# Patient Record
Sex: MALE | Race: WHITE | Employment: FULL TIME | URBAN - METROPOLITAN AREA
[De-identification: names, ages, dates, MRNs, and addresses within clinical notes are randomized per-mention and may not be internally consistent; named-entity substitution may affect disease eponyms.]

---

## 2017-11-22 ENCOUNTER — OFFICE VISIT (OUTPATIENT)
Dept: FAMILY MEDICINE CLINIC | Facility: CLINIC | Age: 22
End: 2017-11-22

## 2017-11-22 VITALS
OXYGEN SATURATION: 98 % | WEIGHT: 220 LBS | RESPIRATION RATE: 16 BRPM | DIASTOLIC BLOOD PRESSURE: 80 MMHG | HEIGHT: 71 IN | TEMPERATURE: 99 F | HEART RATE: 104 BPM | SYSTOLIC BLOOD PRESSURE: 132 MMHG | BODY MASS INDEX: 30.8 KG/M2

## 2017-11-22 DIAGNOSIS — J02.0 STREP THROAT: Primary | ICD-10-CM

## 2017-11-22 DIAGNOSIS — S57.01XA CRUSHING INJURY OF RIGHT ELBOW, INITIAL ENCOUNTER: ICD-10-CM

## 2017-11-22 PROCEDURE — 87880 STREP A ASSAY W/OPTIC: CPT | Performed by: FAMILY MEDICINE

## 2017-11-22 PROCEDURE — 99203 OFFICE O/P NEW LOW 30 MIN: CPT | Performed by: FAMILY MEDICINE

## 2017-11-22 RX ORDER — DOXYCYCLINE HYCLATE 100 MG/1
CAPSULE ORAL
Qty: 8 CAPSULE | Refills: 0 | Status: SHIPPED | OUTPATIENT
Start: 2017-11-22 | End: 2019-09-02

## 2017-11-22 NOTE — PROGRESS NOTES
Patient presents with:  Nasal Congestion: x 2 days, H/A, dizzy, felt hot        Alexander Meredith is a 20year old male who presents for sore throat. Pain of the throat last  2  days. Not worse or better, pain with swallowing liquids and solids.  Sharp, no radiati without murmur  L elbow: mild pain on deep palpation. ASSESSMENT AND PLAN:   Mendoza Williamson is a 20year old male who presents with acute strep pharyngitis. Increase fluids, Tylenol prn. Strep test:positive.     Signed Prescriptions Disp Refills    Doxycyc

## 2017-11-22 NOTE — PATIENT INSTRUCTIONS
[facility] Alli Silva M.D.     Hand and Upper Extremity Surgery  Physician       77 [facility], [address] [place] [address], [address] [place] [address] 222, [address] [address]     [phone]

## 2019-09-02 ENCOUNTER — APPOINTMENT (OUTPATIENT)
Dept: CT IMAGING | Age: 24
End: 2019-09-02
Attending: EMERGENCY MEDICINE
Payer: COMMERCIAL

## 2019-09-02 ENCOUNTER — HOSPITAL ENCOUNTER (EMERGENCY)
Age: 24
Discharge: HOME | End: 2019-09-02
Attending: EMERGENCY MEDICINE
Payer: COMMERCIAL

## 2019-09-02 ENCOUNTER — OFFICE VISIT (OUTPATIENT)
Dept: FAMILY MEDICINE CLINIC | Facility: CLINIC | Age: 24
End: 2019-09-02
Payer: COMMERCIAL

## 2019-09-02 ENCOUNTER — APPOINTMENT (OUTPATIENT)
Dept: GENERAL RADIOLOGY | Age: 24
End: 2019-09-02
Attending: EMERGENCY MEDICINE
Payer: COMMERCIAL

## 2019-09-02 VITALS
RESPIRATION RATE: 16 BRPM | OXYGEN SATURATION: 98 % | HEIGHT: 72 IN | HEART RATE: 101 BPM | SYSTOLIC BLOOD PRESSURE: 123 MMHG | TEMPERATURE: 99 F | BODY MASS INDEX: 29.39 KG/M2 | DIASTOLIC BLOOD PRESSURE: 72 MMHG | WEIGHT: 217 LBS

## 2019-09-02 VITALS
DIASTOLIC BLOOD PRESSURE: 76 MMHG | HEIGHT: 71 IN | TEMPERATURE: 101 F | HEART RATE: 102 BPM | SYSTOLIC BLOOD PRESSURE: 122 MMHG | WEIGHT: 217 LBS | BODY MASS INDEX: 30.38 KG/M2 | OXYGEN SATURATION: 98 % | RESPIRATION RATE: 24 BRPM

## 2019-09-02 DIAGNOSIS — Z02.9 ADMINISTRATIVE ENCOUNTER: Primary | ICD-10-CM

## 2019-09-02 DIAGNOSIS — R51.9 HEADACHE DISORDER: Primary | ICD-10-CM

## 2019-09-02 DIAGNOSIS — R50.9 FEBRILE ILLNESS, ACUTE: ICD-10-CM

## 2019-09-02 LAB
ALBUMIN SERPL-MCNC: 4.3 G/DL (ref 3.4–5)
ALBUMIN/GLOB SERPL: 1.1 {RATIO} (ref 1–2)
ALP LIVER SERPL-CCNC: 91 U/L (ref 45–117)
ALT SERPL-CCNC: 65 U/L (ref 16–61)
ANION GAP SERPL CALC-SCNC: 7 MMOL/L (ref 0–18)
AST SERPL-CCNC: 37 U/L (ref 15–37)
BASOPHILS # BLD AUTO: 0.04 X10(3) UL (ref 0–0.2)
BASOPHILS NFR BLD AUTO: 0.4 %
BILIRUB SERPL-MCNC: 0.7 MG/DL (ref 0.1–2)
BUN BLD-MCNC: 11 MG/DL (ref 7–18)
BUN/CREAT SERPL: 8.5 (ref 10–20)
CALCIUM BLD-MCNC: 9 MG/DL (ref 8.5–10.1)
CHLORIDE SERPL-SCNC: 105 MMOL/L (ref 98–112)
CLARITY UR REFRACT.AUTO: CLEAR
CO2 SERPL-SCNC: 25 MMOL/L (ref 21–32)
COLOR UR AUTO: YELLOW
CREAT BLD-MCNC: 1.29 MG/DL (ref 0.7–1.3)
DEPRECATED RDW RBC AUTO: 41.3 FL (ref 35.1–46.3)
EOSINOPHIL # BLD AUTO: 0.18 X10(3) UL (ref 0–0.7)
EOSINOPHIL NFR BLD AUTO: 1.9 %
ERYTHROCYTE [DISTWIDTH] IN BLOOD BY AUTOMATED COUNT: 12.4 % (ref 11–15)
GLOBULIN PLAS-MCNC: 3.9 G/DL (ref 2.8–4.4)
GLUCOSE BLD-MCNC: 103 MG/DL (ref 70–99)
GLUCOSE UR STRIP.AUTO-MCNC: NEGATIVE MG/DL
HCT VFR BLD AUTO: 45.5 % (ref 39–53)
HGB BLD-MCNC: 15.6 G/DL (ref 13–17.5)
IMM GRANULOCYTES # BLD AUTO: 0.04 X10(3) UL (ref 0–1)
IMM GRANULOCYTES NFR BLD: 0.4 %
KETONES UR STRIP.AUTO-MCNC: 15 MG/DL
LEUKOCYTE ESTERASE UR QL STRIP.AUTO: NEGATIVE
LYMPHOCYTES # BLD AUTO: 0.98 X10(3) UL (ref 1–4)
LYMPHOCYTES NFR BLD AUTO: 10.6 %
M PROTEIN MFR SERPL ELPH: 8.2 G/DL (ref 6.4–8.2)
MCH RBC QN AUTO: 31.3 PG (ref 26–34)
MCHC RBC AUTO-ENTMCNC: 34.3 G/DL (ref 31–37)
MCV RBC AUTO: 91.2 FL (ref 80–100)
MONOCYTES # BLD AUTO: 0.67 X10(3) UL (ref 0.1–1)
MONOCYTES NFR BLD AUTO: 7.2 %
NEUTROPHILS # BLD AUTO: 7.37 X10 (3) UL (ref 1.5–7.7)
NEUTROPHILS # BLD AUTO: 7.37 X10(3) UL (ref 1.5–7.7)
NEUTROPHILS NFR BLD AUTO: 79.5 %
NITRITE UR QL STRIP.AUTO: NEGATIVE
OSMOLALITY SERPL CALC.SUM OF ELEC: 284 MOSM/KG (ref 275–295)
PH UR STRIP.AUTO: 5.5 [PH] (ref 4.5–8)
PLATELET # BLD AUTO: 291 10(3)UL (ref 150–450)
POTASSIUM SERPL-SCNC: 3.8 MMOL/L (ref 3.5–5.1)
RBC # BLD AUTO: 4.99 X10(6)UL (ref 4.3–5.7)
SODIUM SERPL-SCNC: 137 MMOL/L (ref 136–145)
SP GR UR STRIP.AUTO: 1.02 (ref 1–1.03)
UROBILINOGEN UR STRIP.AUTO-MCNC: 0.2 MG/DL
WBC # BLD AUTO: 9.3 X10(3) UL (ref 4–11)

## 2019-09-02 PROCEDURE — 81003 URINALYSIS AUTO W/O SCOPE: CPT | Performed by: EMERGENCY MEDICINE

## 2019-09-02 PROCEDURE — 85025 COMPLETE CBC W/AUTO DIFF WBC: CPT | Performed by: EMERGENCY MEDICINE

## 2019-09-02 PROCEDURE — 70450 CT HEAD/BRAIN W/O DYE: CPT | Performed by: EMERGENCY MEDICINE

## 2019-09-02 PROCEDURE — 96375 TX/PRO/DX INJ NEW DRUG ADDON: CPT

## 2019-09-02 PROCEDURE — 99284 EMERGENCY DEPT VISIT MOD MDM: CPT

## 2019-09-02 PROCEDURE — 71046 X-RAY EXAM CHEST 2 VIEWS: CPT | Performed by: EMERGENCY MEDICINE

## 2019-09-02 PROCEDURE — 96374 THER/PROPH/DIAG INJ IV PUSH: CPT

## 2019-09-02 PROCEDURE — 80053 COMPREHEN METABOLIC PANEL: CPT | Performed by: EMERGENCY MEDICINE

## 2019-09-02 PROCEDURE — 96361 HYDRATE IV INFUSION ADD-ON: CPT

## 2019-09-02 RX ORDER — KETOROLAC TROMETHAMINE 30 MG/ML
30 INJECTION, SOLUTION INTRAMUSCULAR; INTRAVENOUS ONCE
Status: COMPLETED | OUTPATIENT
Start: 2019-09-02 | End: 2019-09-02

## 2019-09-02 RX ORDER — ACETAMINOPHEN 500 MG
1000 TABLET ORAL ONCE
Status: COMPLETED | OUTPATIENT
Start: 2019-09-02 | End: 2019-09-02

## 2019-09-02 RX ORDER — METOCLOPRAMIDE 10 MG/1
10 TABLET ORAL 3 TIMES DAILY PRN
Qty: 20 TABLET | Refills: 0 | Status: SHIPPED | OUTPATIENT
Start: 2019-09-02 | End: 2019-10-02

## 2019-09-02 RX ORDER — DIPHENHYDRAMINE HYDROCHLORIDE 50 MG/ML
25 INJECTION INTRAMUSCULAR; INTRAVENOUS ONCE
Status: COMPLETED | OUTPATIENT
Start: 2019-09-02 | End: 2019-09-02

## 2019-09-02 RX ORDER — METOCLOPRAMIDE HYDROCHLORIDE 5 MG/ML
10 INJECTION INTRAMUSCULAR; INTRAVENOUS ONCE
Status: COMPLETED | OUTPATIENT
Start: 2019-09-02 | End: 2019-09-02

## 2019-09-02 NOTE — ED PROVIDER NOTES
Patient Seen in: THE [facility] Emergency Department In [place]    History   Patient presents with:  Headache (neurologic)  Fever (infectious)    Stated Complaint: Headache x 5 days, fever 102.  today    HPI    40-year-old white male who presents the emerge fr low-grade temperature and is tachycardic. Head is normocephalic atraumatic conjunctiva is clear. Pupils are equal round reactive to light extraocular motions are intact. Patient has normal facial symmetry. Tongue is midline.   Neck is supple without men individual orders.    RAINBOW DRAW BLUE   RAINBOW DRAW LAVENDER   RAINBOW DRAW LIGHT GREEN   RAINBOW DRAW GOLD          CT scan of the head reveals:    FINDINGS:    Ventricles and sulci are within normal limits.  There is no focal parenchymal attenuation ab symptoms develop.               Disposition and Plan     Clinical Impression:  Headache disorder  (primary encounter diagnosis)  Febrile illness, acute    Disposition:  Discharge  9/2/2019  6:59 pm    Follow-up:  Kim Bearden MD  [address] [place] [address]

## 2019-09-02 NOTE — PROGRESS NOTES
Pt presented with fever, headache, nauseous, and dizziness X 5 days. Reports hx of migraines as a child after playing sports, but not in years. He did play softball 5 nights ago.   Headache is all over, relieved with Excedrin but comes back worse after me

## 2019-09-02 NOTE — ED INITIAL ASSESSMENT (HPI)
Pt c/o headache for 5 days with a fever that began today; pt has been taking Tylenol, Motrin and Excedrin without relief;

## 2019-09-03 ENCOUNTER — TELEPHONE (OUTPATIENT)
Dept: FAMILY MEDICINE CLINIC | Facility: CLINIC | Age: 24
End: 2019-09-03

## 2019-09-04 ENCOUNTER — OFFICE VISIT (OUTPATIENT)
Dept: FAMILY MEDICINE CLINIC | Facility: CLINIC | Age: 24
End: 2019-09-04
Payer: COMMERCIAL

## 2019-09-04 ENCOUNTER — LAB ENCOUNTER (OUTPATIENT)
Dept: LAB | Age: 24
End: 2019-09-04
Attending: NURSE PRACTITIONER
Payer: COMMERCIAL

## 2019-09-04 VITALS
HEART RATE: 84 BPM | SYSTOLIC BLOOD PRESSURE: 120 MMHG | DIASTOLIC BLOOD PRESSURE: 70 MMHG | HEIGHT: 71 IN | TEMPERATURE: 98 F | OXYGEN SATURATION: 99 % | BODY MASS INDEX: 29.96 KG/M2 | WEIGHT: 214 LBS | RESPIRATION RATE: 16 BRPM

## 2019-09-04 DIAGNOSIS — R50.9 FEVER, UNSPECIFIED FEVER CAUSE: ICD-10-CM

## 2019-09-04 DIAGNOSIS — G43.909 MIGRAINE WITHOUT STATUS MIGRAINOSUS, NOT INTRACTABLE, UNSPECIFIED MIGRAINE TYPE: Primary | ICD-10-CM

## 2019-09-04 DIAGNOSIS — R11.2 NAUSEA AND VOMITING, INTRACTABILITY OF VOMITING NOT SPECIFIED, UNSPECIFIED VOMITING TYPE: ICD-10-CM

## 2019-09-04 LAB
ALBUMIN SERPL-MCNC: 3.9 G/DL (ref 3.4–5)
ALBUMIN/GLOB SERPL: 0.9 {RATIO} (ref 1–2)
ALP LIVER SERPL-CCNC: 80 U/L (ref 45–117)
ALT SERPL-CCNC: 55 U/L (ref 16–61)
ANION GAP SERPL CALC-SCNC: 9 MMOL/L (ref 0–18)
APPEARANCE: CLEAR
AST SERPL-CCNC: 24 U/L (ref 15–37)
BASOPHILS # BLD AUTO: 0.04 X10(3) UL (ref 0–0.2)
BASOPHILS NFR BLD AUTO: 0.4 %
BILIRUB SERPL-MCNC: 0.9 MG/DL (ref 0.1–2)
BILIRUBIN: NEGATIVE
BUN BLD-MCNC: 13 MG/DL (ref 7–18)
BUN/CREAT SERPL: 10.4 (ref 10–20)
CALCIUM BLD-MCNC: 9.3 MG/DL (ref 8.5–10.1)
CHLORIDE SERPL-SCNC: 102 MMOL/L (ref 98–112)
CO2 SERPL-SCNC: 28 MMOL/L (ref 21–32)
CONTROL LINE PRESENT WITH A CLEAR BACKGROUND (YES/NO): YES YES/NO
CREAT BLD-MCNC: 1.25 MG/DL (ref 0.7–1.3)
DEPRECATED RDW RBC AUTO: 42.7 FL (ref 35.1–46.3)
EOSINOPHIL # BLD AUTO: 0.19 X10(3) UL (ref 0–0.7)
EOSINOPHIL NFR BLD AUTO: 1.7 %
ERYTHROCYTE [DISTWIDTH] IN BLOOD BY AUTOMATED COUNT: 12.4 % (ref 11–15)
FLUAV + FLUBV RNA SPEC NAA+PROBE: NEGATIVE
GLOBULIN PLAS-MCNC: 4.3 G/DL (ref 2.8–4.4)
GLUCOSE (URINE DIPSTICK): NEGATIVE MG/DL
GLUCOSE BLD-MCNC: 93 MG/DL (ref 70–99)
HAV IGM SER QL: NONREACTIVE
HBV CORE IGM SER QL: NONREACTIVE
HBV SURFACE AG SERPL QL IA: NONREACTIVE
HCT VFR BLD AUTO: 47.6 % (ref 39–53)
HCV AB SERPL QL IA: NONREACTIVE
HGB BLD-MCNC: 15.6 G/DL (ref 13–17.5)
IMM GRANULOCYTES # BLD AUTO: 0.08 X10(3) UL (ref 0–1)
IMM GRANULOCYTES NFR BLD: 0.7 %
KETONES (URINE DIPSTICK): NEGATIVE MG/DL
LEUKOCYTES: NEGATIVE
LYMPHOCYTES # BLD AUTO: 1.31 X10(3) UL (ref 1–4)
LYMPHOCYTES NFR BLD AUTO: 11.5 %
M PROTEIN MFR SERPL ELPH: 8.2 G/DL (ref 6.4–8.2)
MCH RBC QN AUTO: 30.4 PG (ref 26–34)
MCHC RBC AUTO-ENTMCNC: 32.8 G/DL (ref 31–37)
MCV RBC AUTO: 92.8 FL (ref 80–100)
MONOCYTES # BLD AUTO: 0.5 X10(3) UL (ref 0.1–1)
MONOCYTES NFR BLD AUTO: 4.4 %
MONOSCREEN: NEGATIVE
MULTISTIX LOT#: NORMAL NUMERIC
NEUTROPHILS # BLD AUTO: 9.24 X10 (3) UL (ref 1.5–7.7)
NEUTROPHILS # BLD AUTO: 9.24 X10(3) UL (ref 1.5–7.7)
NEUTROPHILS NFR BLD AUTO: 81.3 %
NITRITE, URINE: NEGATIVE
OSMOLALITY SERPL CALC.SUM OF ELEC: 288 MOSM/KG (ref 275–295)
PH, URINE: 6 (ref 4.5–8)
PLATELET # BLD AUTO: 350 10(3)UL (ref 150–450)
POTASSIUM SERPL-SCNC: 4.5 MMOL/L (ref 3.5–5.1)
RBC # BLD AUTO: 5.13 X10(6)UL (ref 4.3–5.7)
SED RATE-ML: 19 MM/HR (ref 0–12)
SODIUM SERPL-SCNC: 139 MMOL/L (ref 136–145)
SPECIFIC GRAVITY: 1.01 (ref 1–1.03)
STREP GRP A CUL-SCR: NEGATIVE
TSI SER-ACNC: 3.31 MIU/ML (ref 0.36–3.74)
URINE-COLOR: YELLOW
UROBILINOGEN,SEMI-QN: 1 MG/DL (ref 0–1.9)
VIT D+METAB SERPL-MCNC: 16.9 NG/ML (ref 30–100)
WBC # BLD AUTO: 11.4 X10(3) UL (ref 4–11)

## 2019-09-04 PROCEDURE — 86403 PARTICLE AGGLUT ANTBDY SCRN: CPT | Performed by: NURSE PRACTITIONER

## 2019-09-04 PROCEDURE — 36415 COLL VENOUS BLD VENIPUNCTURE: CPT | Performed by: NURSE PRACTITIONER

## 2019-09-04 PROCEDURE — 87880 STREP A ASSAY W/OPTIC: CPT | Performed by: NURSE PRACTITIONER

## 2019-09-04 PROCEDURE — 87798 DETECT AGENT NOS DNA AMP: CPT | Performed by: NURSE PRACTITIONER

## 2019-09-04 PROCEDURE — 80074 ACUTE HEPATITIS PANEL: CPT | Performed by: NURSE PRACTITIONER

## 2019-09-04 PROCEDURE — 80050 GENERAL HEALTH PANEL: CPT | Performed by: NURSE PRACTITIONER

## 2019-09-04 PROCEDURE — 82306 VITAMIN D 25 HYDROXY: CPT | Performed by: NURSE PRACTITIONER

## 2019-09-04 PROCEDURE — 86788 WEST NILE VIRUS AB IGM: CPT | Performed by: NURSE PRACTITIONER

## 2019-09-04 PROCEDURE — 81003 URINALYSIS AUTO W/O SCOPE: CPT | Performed by: NURSE PRACTITIONER

## 2019-09-04 PROCEDURE — 86789 WEST NILE VIRUS ANTIBODY: CPT | Performed by: NURSE PRACTITIONER

## 2019-09-04 PROCEDURE — 99214 OFFICE O/P EST MOD 30 MIN: CPT | Performed by: NURSE PRACTITIONER

## 2019-09-04 PROCEDURE — 87502 INFLUENZA DNA AMP PROBE: CPT | Performed by: NURSE PRACTITIONER

## 2019-09-04 PROCEDURE — 85652 RBC SED RATE AUTOMATED: CPT | Performed by: NURSE PRACTITIONER

## 2019-09-04 PROCEDURE — 87081 CULTURE SCREEN ONLY: CPT | Performed by: NURSE PRACTITIONER

## 2019-09-04 RX ORDER — ONDANSETRON 4 MG/1
4 TABLET, FILM COATED ORAL EVERY 8 HOURS PRN
Qty: 15 TABLET | Refills: 1 | Status: SHIPPED | OUTPATIENT
Start: 2019-09-04 | End: 2021-02-12 | Stop reason: ALTCHOICE

## 2019-09-04 RX ORDER — BUTALBITAL AND ACETAMINOPHEN 50; 325 MG/1; MG/1
1 TABLET ORAL 4 TIMES DAILY PRN
Qty: 60 TABLET | Refills: 0 | Status: SHIPPED | OUTPATIENT
Start: 2019-09-04 | End: 2021-02-12 | Stop reason: ALTCHOICE

## 2019-09-04 RX ORDER — ACETAMINOPHEN 500 MG
500 TABLET ORAL EVERY 6 HOURS PRN
COMMUNITY
End: 2021-02-12 | Stop reason: ALTCHOICE

## 2019-09-04 RX ORDER — IBUPROFEN 200 MG
200 TABLET ORAL EVERY 6 HOURS PRN
COMMUNITY
End: 2021-02-12 | Stop reason: ALTCHOICE

## 2019-09-04 NOTE — PROGRESS NOTES
CHIEF COMPLAINT:     Patient presents with:  ER F/U: NP ER follow up, c/o headache, fever and rash        HPI:     Michelle Doll is a 21year old male presents with complaints of headaches. Has had this series of Headaches since  Wednesday 8/28/19.  Reports pl Headaches as described above. Denies lightheadedness. No seizures, no confusion, no weakness, no abnormal sensation, no vertigo.     EXAM:   /70   Pulse 84   Temp 98 °F (36.7 °C) (Oral)   Resp 16   Ht 71\"   Wt 214 lb   SpO2 99%   BMI 29.85 kg/m² tablet; Take 1 tablet (4 mg total) by mouth every 8 (eight) hours as needed for Nausea.     F/u in 2 days if not getting better or getting worse   Increase fluids , Tylenol for fever

## 2019-09-04 NOTE — PATIENT INSTRUCTIONS
Migraine Headache  This often severe type of headache is different from other types of headaches in that symptoms other than pain occur with the headache.  Nausea and vomiting, lightheadedness, sensitivity to light (photophobia), and other visual disturba If stress seems to be a trigger for your headaches, figure out what is causing stress in your life. Learn new ways to handle your stress. Ideas include regular exercise, biofeedback, self-hypnosis, yoga, and meditation.  Talk with your healthcare provider t Call your healthcare provider right away if any of these occur:  · Your head pain gets worse, or doesn’t get better within 24 hours  · You can’t keep liquids down (repeated vomiting)  · Pain in your sinuses, ears, or throat  · Fever of 100.4º F (38º C) or · Your appetite may be poor, so a light diet is fine. Avoid dehydration by drinking 6 to 8 glasses of fluids per day (such as water, soft drinks, sports drinks, juices, tea, or soup).  If you have congestion, extra fluids will help loosen secretions in the · Confusion  · Extreme drowsiness or trouble awakening  · Fainting or loss of consciousness  · Rapid heart rate  · Low blood pressure  · Vomiting blood, or large amounts of blood in stool  · Seizure  When to seek medical advice  Call your healthcare provid

## 2019-09-06 ENCOUNTER — TELEPHONE (OUTPATIENT)
Dept: FAMILY MEDICINE CLINIC | Facility: CLINIC | Age: 24
End: 2019-09-06

## 2019-09-06 LAB
WEST NILE VIRUS AB, IGG, SER: 0.52 IV
WEST NILE VIRUS AB, IGM, SER: 0.05 IV

## 2019-09-06 NOTE — TELEPHONE ENCOUNTER
----- Message from PARVEEN Awan-CHRISTOPHER sent at 9/5/2019  7:30 AM CDT -----  Vitamin D is very low -sent new prescription for Vitamin D take it once a week , white blood cells slightly elevated sent prescription for antibiotics daily x 7 days      levoflox

## 2019-09-06 NOTE — TELEPHONE ENCOUNTER
Left detailed VM for pt regarding results and instructions listed below. Pt instructed to call back with any further questions/concerns. [employer] msg sent.

## 2019-09-09 ENCOUNTER — TELEPHONE (OUTPATIENT)
Dept: FAMILY MEDICINE CLINIC | Facility: CLINIC | Age: 24
End: 2019-09-09

## 2020-08-12 ENCOUNTER — TELEPHONE (OUTPATIENT)
Dept: FAMILY MEDICINE CLINIC | Facility: CLINIC | Age: 25
End: 2020-08-12

## 2020-08-12 NOTE — TELEPHONE ENCOUNTER
Patient called, he had a positive Covid test on July 31 at [address], he needs to go back to work and they are requiring a negative Covid test. He would like to know if Dr. Priscilla Franco will order a test or where he can get one done.     Please

## 2020-08-12 NOTE — TELEPHONE ENCOUNTER
Detailed VM left for patient advising that THE [facility] is not retesting after a positive result-not CDC guidelines. I referred him to [facility] testing or [facility] for a list of testing locations. Encouraged call back with any questions.

## 2021-02-01 ENCOUNTER — PATIENT MESSAGE (OUTPATIENT)
Dept: FAMILY MEDICINE CLINIC | Facility: CLINIC | Age: 26
End: 2021-02-01

## 2021-02-02 NOTE — TELEPHONE ENCOUNTER
From: Joe Bourgeois  To: ANA LAURA Velazquez  Sent: 2/1/2021 8:33 PM CST  Subject: Other    Hello,    Do I need a doctors order to go in for blood work? I haven't had it done recently and I'd like to do that as well as finding out my blood type.      Carlos

## 2021-02-11 PROBLEM — S54.10XD: Status: ACTIVE | Noted: 2018-08-03

## 2021-02-12 ENCOUNTER — OFFICE VISIT (OUTPATIENT)
Dept: FAMILY MEDICINE CLINIC | Facility: CLINIC | Age: 26
End: 2021-02-12
Payer: COMMERCIAL

## 2021-02-12 VITALS
SYSTOLIC BLOOD PRESSURE: 136 MMHG | DIASTOLIC BLOOD PRESSURE: 88 MMHG | HEIGHT: 71 IN | BODY MASS INDEX: 33.04 KG/M2 | HEART RATE: 103 BPM | RESPIRATION RATE: 17 BRPM | WEIGHT: 236 LBS | TEMPERATURE: 98 F | OXYGEN SATURATION: 97 %

## 2021-02-12 DIAGNOSIS — R21 PENILE RASH: ICD-10-CM

## 2021-02-12 DIAGNOSIS — E66.9 OBESITY (BMI 30-39.9): ICD-10-CM

## 2021-02-12 DIAGNOSIS — Z00.00 ENCOUNTER FOR ANNUAL PHYSICAL EXAM: Primary | ICD-10-CM

## 2021-02-12 DIAGNOSIS — Z00.00 LABORATORY EXAMINATION ORDERED AS PART OF A ROUTINE GENERAL MEDICAL EXAMINATION: ICD-10-CM

## 2021-02-12 DIAGNOSIS — E55.9 VITAMIN D DEFICIENCY: ICD-10-CM

## 2021-02-12 PROCEDURE — 3008F BODY MASS INDEX DOCD: CPT | Performed by: EMERGENCY MEDICINE

## 2021-02-12 PROCEDURE — 3079F DIAST BP 80-89 MM HG: CPT | Performed by: EMERGENCY MEDICINE

## 2021-02-12 PROCEDURE — 3075F SYST BP GE 130 - 139MM HG: CPT | Performed by: EMERGENCY MEDICINE

## 2021-02-12 PROCEDURE — 99395 PREV VISIT EST AGE 18-39: CPT | Performed by: EMERGENCY MEDICINE

## 2021-02-12 NOTE — PATIENT INSTRUCTIONS
Thank you for choosing [facility]  To Do:  FOR JADON Milner [address]        1. Follow up yearly or as needed  2. Have blood tests done after fasting  3. Need to lose weight  4. Overall decreased caloric intake in order to promote weight loss.   Eat 1500 pressure is less than 120/80 mm Hg; yearly if your systolic blood pressure is 120 to 139 mm Hg, or your diastolic blood pressure reading is 80 to 89 mm Hg   Depression All men in this age group At routine exams   Diabetes mellitus, type 2 Adults who have n first dose   Influenza (flu) All men in this age group Once a year   Measles, mumps, rubella (MMR) All men in this age group who have no record of these infections or vaccines 1 or 2 doses through age 54   Meningococcal Men at increased risk for infection

## 2021-02-12 NOTE — PROGRESS NOTES
Chief Complaint:   Patient presents with:  Physical: Annual physical     HPI:   This is a 31year old male who present for a yearly annual exam    WELL-MALE EXAM     1. Age:   31   2. Have you had any of the following problems:         a.  High blood pr Alcohol use: Yes    Drug use: No    Family History:  Family History   Problem Relation Age of Onset   • No Known Problems Father    • No Known Problems Mother        Allergies     Ceclor [Cefaclor]       RASH    No current outpatient medications on file pr VITAMIN D, 25-HYDROXY; Future    4. Penile rash  Hemangioma?  - DERM - INTERNAL    5. Obesity (BMI 30-39. 9)  Counseled on weight loss          PATIENT INSTRUCTIONS:     1. Follow up yearly or as needed  2. Have blood tests done after fasting  3.  Need to lo

## 2021-02-20 ENCOUNTER — LAB ENCOUNTER (OUTPATIENT)
Dept: LAB | Age: 26
End: 2021-02-20
Attending: EMERGENCY MEDICINE
Payer: COMMERCIAL

## 2021-02-20 ENCOUNTER — PATIENT MESSAGE (OUTPATIENT)
Dept: FAMILY MEDICINE CLINIC | Facility: CLINIC | Age: 26
End: 2021-02-20

## 2021-02-20 DIAGNOSIS — Z00.00 LABORATORY EXAMINATION ORDERED AS PART OF A ROUTINE GENERAL MEDICAL EXAMINATION: ICD-10-CM

## 2021-02-20 DIAGNOSIS — E55.9 VITAMIN D DEFICIENCY: ICD-10-CM

## 2021-02-20 LAB
ALBUMIN SERPL-MCNC: 4.4 G/DL (ref 3.4–5)
ALBUMIN/GLOB SERPL: 1.2 {RATIO} (ref 1–2)
ALP LIVER SERPL-CCNC: 55 U/L
ALT SERPL-CCNC: 37 U/L
ANION GAP SERPL CALC-SCNC: 4 MMOL/L (ref 0–18)
AST SERPL-CCNC: 22 U/L (ref 15–37)
BASOPHILS # BLD AUTO: 0.05 X10(3) UL (ref 0–0.2)
BASOPHILS NFR BLD AUTO: 0.8 %
BILIRUB SERPL-MCNC: 1 MG/DL (ref 0.1–2)
BUN BLD-MCNC: 13 MG/DL (ref 7–18)
BUN/CREAT SERPL: 12.4 (ref 10–20)
CALCIUM BLD-MCNC: 9.4 MG/DL (ref 8.5–10.1)
CHLORIDE SERPL-SCNC: 107 MMOL/L (ref 98–112)
CHOLEST SMN-MCNC: 153 MG/DL (ref ?–200)
CO2 SERPL-SCNC: 30 MMOL/L (ref 21–32)
CREAT BLD-MCNC: 1.05 MG/DL
DEPRECATED RDW RBC AUTO: 42.6 FL (ref 35.1–46.3)
EOSINOPHIL # BLD AUTO: 0.15 X10(3) UL (ref 0–0.7)
EOSINOPHIL NFR BLD AUTO: 2.3 %
ERYTHROCYTE [DISTWIDTH] IN BLOOD BY AUTOMATED COUNT: 12.4 % (ref 11–15)
GLOBULIN PLAS-MCNC: 3.6 G/DL (ref 2.8–4.4)
GLUCOSE BLD-MCNC: 98 MG/DL (ref 70–99)
HCT VFR BLD AUTO: 46.9 %
HDLC SERPL-MCNC: 55 MG/DL (ref 40–59)
HGB BLD-MCNC: 15.3 G/DL
IMM GRANULOCYTES # BLD AUTO: 0.02 X10(3) UL (ref 0–1)
IMM GRANULOCYTES NFR BLD: 0.3 %
LDLC SERPL CALC-MCNC: 82 MG/DL (ref ?–100)
LYMPHOCYTES # BLD AUTO: 2 X10(3) UL (ref 1–4)
LYMPHOCYTES NFR BLD AUTO: 30.3 %
M PROTEIN MFR SERPL ELPH: 8 G/DL (ref 6.4–8.2)
MCH RBC QN AUTO: 30.8 PG (ref 26–34)
MCHC RBC AUTO-ENTMCNC: 32.6 G/DL (ref 31–37)
MCV RBC AUTO: 94.4 FL
MONOCYTES # BLD AUTO: 0.63 X10(3) UL (ref 0.1–1)
MONOCYTES NFR BLD AUTO: 9.6 %
NEUTROPHILS # BLD AUTO: 3.74 X10 (3) UL (ref 1.5–7.7)
NEUTROPHILS # BLD AUTO: 3.74 X10(3) UL (ref 1.5–7.7)
NEUTROPHILS NFR BLD AUTO: 56.7 %
NONHDLC SERPL-MCNC: 98 MG/DL (ref ?–130)
OSMOLALITY SERPL CALC.SUM OF ELEC: 292 MOSM/KG (ref 275–295)
PATIENT FASTING Y/N/NP: YES
PATIENT FASTING Y/N/NP: YES
PLATELET # BLD AUTO: 326 10(3)UL (ref 150–450)
POTASSIUM SERPL-SCNC: 4.5 MMOL/L (ref 3.5–5.1)
RBC # BLD AUTO: 4.97 X10(6)UL
SODIUM SERPL-SCNC: 141 MMOL/L (ref 136–145)
TRIGL SERPL-MCNC: 80 MG/DL (ref 30–149)
TSI SER-ACNC: 1.59 MIU/ML (ref 0.36–3.74)
VIT D+METAB SERPL-MCNC: 28.1 NG/ML (ref 30–100)
VLDLC SERPL CALC-MCNC: 16 MG/DL (ref 0–30)
WBC # BLD AUTO: 6.6 X10(3) UL (ref 4–11)

## 2021-02-20 PROCEDURE — 80050 GENERAL HEALTH PANEL: CPT | Performed by: EMERGENCY MEDICINE

## 2021-02-20 PROCEDURE — 36415 COLL VENOUS BLD VENIPUNCTURE: CPT | Performed by: EMERGENCY MEDICINE

## 2021-02-20 PROCEDURE — 82306 VITAMIN D 25 HYDROXY: CPT | Performed by: EMERGENCY MEDICINE

## 2021-02-20 PROCEDURE — 80061 LIPID PANEL: CPT | Performed by: EMERGENCY MEDICINE

## 2021-02-22 ENCOUNTER — PATIENT MESSAGE (OUTPATIENT)
Dept: FAMILY MEDICINE CLINIC | Facility: CLINIC | Age: 26
End: 2021-02-22

## 2021-02-22 DIAGNOSIS — E55.9 VITAMIN D DEFICIENCY: Primary | ICD-10-CM

## 2021-02-22 NOTE — TELEPHONE ENCOUNTER
From: Izabel Candelario  To: Izzy Gonzalez MD  Sent: 2/22/2021 10:11 AM CST  Subject: Test Results Question    Hi Sugar Toribio     I will start taking vitamin d. Glad to see levels are good.  I had requested to also figure out my blood type did that test not g

## 2021-02-22 NOTE — TELEPHONE ENCOUNTER
From: Luba Draper  To: Jaime Cao MD  Sent: 2/20/2021 4:08 PM CST  Subject: Test Results Question    How do I find out my blood type?

## 2022-06-22 ENCOUNTER — HOSPITAL ENCOUNTER (OUTPATIENT)
Age: 27
Discharge: HOME | End: 2022-06-22
Payer: COMMERCIAL

## 2022-06-22 ENCOUNTER — APPOINTMENT (OUTPATIENT)
Dept: GENERAL RADIOLOGY | Age: 27
End: 2022-06-22
Attending: PHYSICIAN ASSISTANT
Payer: COMMERCIAL

## 2022-06-22 VITALS
HEART RATE: 98 BPM | OXYGEN SATURATION: 97 % | BODY MASS INDEX: 29.8 KG/M2 | WEIGHT: 220 LBS | HEIGHT: 72 IN | RESPIRATION RATE: 17 BRPM | TEMPERATURE: 99 F | SYSTOLIC BLOOD PRESSURE: 141 MMHG | DIASTOLIC BLOOD PRESSURE: 82 MMHG

## 2022-06-22 DIAGNOSIS — M70.21 OLECRANON BURSITIS OF RIGHT ELBOW: Primary | ICD-10-CM

## 2022-06-22 DIAGNOSIS — L08.9 SKIN INFECTION: ICD-10-CM

## 2022-06-22 PROCEDURE — 96372 THER/PROPH/DIAG INJ SC/IM: CPT

## 2022-06-22 PROCEDURE — 99214 OFFICE O/P EST MOD 30 MIN: CPT

## 2022-06-22 PROCEDURE — 73080 X-RAY EXAM OF ELBOW: CPT | Performed by: PHYSICIAN ASSISTANT

## 2022-06-22 RX ORDER — KETOROLAC TROMETHAMINE 30 MG/ML
30 INJECTION, SOLUTION INTRAMUSCULAR; INTRAVENOUS ONCE
Status: COMPLETED | OUTPATIENT
Start: 2022-06-22 | End: 2022-06-22

## 2022-06-22 RX ORDER — CLINDAMYCIN HYDROCHLORIDE 300 MG/1
300 CAPSULE ORAL 3 TIMES DAILY
Qty: 30 CAPSULE | Refills: 0 | Status: SHIPPED | OUTPATIENT
Start: 2022-06-22 | End: 2022-07-02

## 2024-01-20 ENCOUNTER — PATIENT MESSAGE (OUTPATIENT)
Dept: FAMILY MEDICINE CLINIC | Facility: CLINIC | Age: 29
End: 2024-01-20

## 2024-01-22 NOTE — TELEPHONE ENCOUNTER
From: Dinh Cade  To: Pat Main  Sent: 1/20/2024 5:07 PM CST  Subject: labs    Hello, I’d like to do whatever labs are included in a routine checkup as I haven’t been in for 3 years. I wasn’t sure if I can walk in without an order for labs to request it all or if you had to order them.    Thanks   Dinh Cade

## 2024-03-18 ENCOUNTER — OFFICE VISIT (OUTPATIENT)
Dept: FAMILY MEDICINE CLINIC | Facility: CLINIC | Age: 29
End: 2024-03-18
Payer: COMMERCIAL

## 2024-03-18 VITALS
HEIGHT: 72 IN | OXYGEN SATURATION: 97 % | WEIGHT: 221 LBS | BODY MASS INDEX: 29.93 KG/M2 | HEART RATE: 102 BPM | RESPIRATION RATE: 18 BRPM | SYSTOLIC BLOOD PRESSURE: 134 MMHG | DIASTOLIC BLOOD PRESSURE: 80 MMHG | TEMPERATURE: 98 F

## 2024-03-18 DIAGNOSIS — J02.9 SORE THROAT: ICD-10-CM

## 2024-03-18 DIAGNOSIS — J02.9 ACUTE VIRAL PHARYNGITIS: Primary | ICD-10-CM

## 2024-03-18 LAB
CONTROL LINE PRESENT WITH A CLEAR BACKGROUND (YES/NO): YES YES/NO
KIT LOT #: NORMAL NUMERIC
STREP GRP A CUL-SCR: NEGATIVE

## 2024-03-18 NOTE — PROGRESS NOTES
CHIEF COMPLAINT:     Chief Complaint   Patient presents with    Sore Throat     Would like to get tested for strep - Entered by patient  S/s for 1 week and cough for 1-1.5 month         HPI:   Dinh Cade is a 28 year old male presents to clinic with complaint of sore throat. Patient has had 5 days.  (+) odynophagia without dysphagia, HA in AMs only.    OTC lozenges with transient relief.  Afebrile, no chills/sweats, no SOb/BARRIENTOS, no cough, no n/v/d, no rash.   Daughter with influenza B x 2 weeks ago, pt ill with similar symptoms at same time.     No current outpatient medications on file.      No past medical history on file.   Social History:  Social History     Socioeconomic History    Marital status: Single   Tobacco Use    Smoking status: Never    Smokeless tobacco: Never   Vaping Use    Vaping Use: Never used   Substance and Sexual Activity    Alcohol use: Yes    Drug use: No        REVIEW OF SYSTEMS:   GENERAL HEALTH: normal appetite  SKIN: Per HPI  HEENT: denies ear pain, See HPI  RESPIRATORY: denies shortness of breath or wheezing  CARDIOVASCULAR: denies chest pain or palpitations   GI: denies vomiting or diarrhea  NEURO: denies dizziness or lightheadedness    EXAM:   /80   Pulse 102   Temp 97.9 °F (36.6 °C) (Oral)   Resp 18   Ht 6' (1.829 m)   Wt 221 lb (100.2 kg)   SpO2 97%   BMI 29.97 kg/m²   GENERAL: well developed, well nourished,in no apparent distress  SKIN: no rashes,no suspicious lesions  HEAD: atraumatic, normocephalic  EYES: conjunctiva clear, EOM intact  EARS: TM's clear, non-injected, no bulging, retraction, or fluid bilaterally  NOSE: nostrils patent, clear nasal discharge, nasal mucosa boggy  THROAT: oral mucosa pink, moist. Posterior pharynx (+) erythematous and injected. without exudates. Tonsils 1+/4.  Breath non malodorous   NECK: supple, trachea midline, no stridor.   LUNGS: clear to auscultation bilaterally, no wheezes or rhonchi. Breathing is non labored.  CARDIO: RRR  without murmur  EXTREMITIES: no cyanosis, clubbing or edema  LYMPH: shotty anterior cervical. no submandibular lymphadenopathy.  No posterior cervical or occipital lymphadenopathy.    Recent Results (from the past 24 hour(s))   Rapid Strep    Collection Time: 03/18/24  4:51 PM   Result Value Ref Range    Strep Grp A Screen      Control Line Present with a clear background (yes/no) Yes Yes/No    Kit Lot # [id number] Numeric    Kit Expiration Date 5/21/2025 Date         ASSESSMENT AND PLAN:   Assessment:   Encounter Diagnoses   Name Primary?    Sore throat     Acute viral pharyngitis Yes       Plan:   Discussed that due to symptoms and negative rapid strep this is most likely viral and does not require antibiotics.  Pt decline cx.  Expected course/duration reviewed.   Discussed viral panel screening--covid-19/influenza A (pt suspects he already had influenza B given dtr dx and pt was ill with similar symptoms at same time).   Pt declines further screening.  Supportive care reviewed, discussed magic mouthwash--declined by pt.      Comfort measures explained and discussed as listed in Patient Instructions    Follow up with PCP in 3-5 days if not improving, condition worsens, or fever greater than or equal to 100.4 persists for 72 hours.    The patient/parent indicates understanding of these issues and agrees to the plan.    Karen Sinclair PA-C

## 2024-03-23 ENCOUNTER — OFFICE VISIT (OUTPATIENT)
Dept: FAMILY MEDICINE CLINIC | Facility: CLINIC | Age: 29
End: 2024-03-23
Payer: COMMERCIAL

## 2024-03-23 VITALS
BODY MASS INDEX: 30.48 KG/M2 | WEIGHT: 225 LBS | OXYGEN SATURATION: 99 % | SYSTOLIC BLOOD PRESSURE: 125 MMHG | RESPIRATION RATE: 16 BRPM | HEIGHT: 72 IN | DIASTOLIC BLOOD PRESSURE: 83 MMHG | TEMPERATURE: 97 F | HEART RATE: 79 BPM

## 2024-03-23 DIAGNOSIS — J02.9 SORE THROAT: Primary | ICD-10-CM

## 2024-03-23 PROCEDURE — 87081 CULTURE SCREEN ONLY: CPT | Performed by: NURSE PRACTITIONER

## 2024-03-23 NOTE — PROGRESS NOTES
CHIEF COMPLAINT:     Chief Complaint   Patient presents with    Sore Throat     Sore throat is worsening from last visit on 3/18/24        HPI:   Dinh Cade is a 28 year old male presents to clinic with symptoms of sore throat. Patient has had for 10 days. Symptoms have been worsening since onset.  Patient reports following associated symptoms: minimal congestion. Slight swollen glands.  No fevers, chills, aches. No abd pain.  Denies hx of GERD or Mono in past that he is aware of.  Has remote history of strep. No one is sick at home right now.  Treating symptoms with: ibuprofen.  Reports sore throat is worse in the morning and gets slightly better during the day.  Ibuprofen does help symptoms, but returns after wears off.  Eating, drinking and sleeping ok.     No current outpatient medications on file.      No past medical history on file.   Social History:  Social History     Socioeconomic History    Marital status: Single   Tobacco Use    Smoking status: Never    Smokeless tobacco: Never   Vaping Use    Vaping Use: Never used   Substance and Sexual Activity    Alcohol use: Yes    Drug use: No        REVIEW OF SYSTEMS:   GENERAL HEALTH: feels well otherwise  SKIN: denies any unusual skin lesions or rashes  HEENT: denies ear pain, See HPI  RESPIRATORY: denies shortness of breath, wheezing, or cough  CARDIOVASCULAR: denies chest pain, palpitations   GI: denies abdominal pain, constipation and diarrhea  NEURO: denies dizziness or lightheadedness    EXAM:   /83   Pulse 79   Temp 97.3 °F (36.3 °C) (Temporal)   Resp 16   Ht 6' (1.829 m)   Wt 225 lb (102.1 kg)   SpO2 99%   BMI 30.52 kg/m²   GENERAL: well developed, well nourished,in no apparent distress  SKIN: no rashes,no suspicious lesions  HEAD: atraumatic, normocephalic  EYES: conjunctiva clear, EOM intact  EARS: TM's clear, non-injected, no bulging, retraction, or fluid  NOSE: nostrils patent, no exudates, nasal mucosa pink and noninflamed  THROAT:  oral mucosa pink, moist. Posterior pharynx erythematous and mildly injected. No exudates. Tonsils 1/4.    NECK: supple, non-tender  LUNGS: clear to auscultation bilaterally, no wheezes or rhonchi. No crackles/rales, good air movement throughout. Breathing is non labored.  CARDIO: RRR without murmur  EXTREMITIES: no cyanosis, clubbing or edema  LYMPH: Positive anterior cervical lymphadenopathy.  No posterior cervical or occipital lymphadenopathy.    Recent Results (from the past 24 hour(s))   Rapid Strep    Collection Time: 03/23/24 11:05 AM   Result Value Ref Range    Strep Grp A Screen Negative Negative    Control Line Present with a clear background (yes/no) Yes Yes/No    Kit Lot # [id number] Numeric    Kit Expiration Date 5/21/25 Date       ASSESSMENT AND PLAN:   Dinh Cade is a 28 year old male who presents with   ASSESSMENT:   Encounter Diagnosis   Name Primary?    Sore throat Yes       PLAN: Negative rapid strep.  Discussed etilogy of sore throat.  Viral/bacterial/allergic vs. Gerd.  Advsied to start Flonase in addition to zyrtec.   Motrin per package instructions for pain.  Follow up with PCP if no improvement in 2-3 days.   Comfort care as described in Patient Instructions. If inability to swallow, tolerate secretions, or any difficulty breathing, seek emergent care.  Patient/Parent has given us consent to send out a culture and understand that they will be contacted in 2-3 days with culture results.      Meds & Refills for this Visit:  Requested Prescriptions      No prescriptions requested or ordered in this encounter       Risks, benefits, and side effects of medication explained and discussed.    There are no Patient Instructions on file for this visit.    The patient indicates understanding of these issues and agrees to the plan.  The patient is asked to return if sx's persist or worsen.    Increase fluids, Motrin/Tylenol prn, rest.  Patient is to follow up if fever greater than or equal to 100.4  persists for 72 hours.

## (undated) NOTE — LETTER
Date & Time: 9/2/2019, 7:16 PM  Patient: Tarik Doran  Encounter Provider(s):    Arti Lauren MD       To Whom It May Concern:    Tarik Doran was seen and treated in our department on 9/2/2019. He should not return to work until 09/05/19.     If you hav

## (undated) NOTE — LETTER
Date: 11/22/2017    Patient Name: Rafat Garrett          To Whom it may concern: The above patient was seen at the [facility] for treatment of a medical condition. This patient should be excused from attending work 11/22/17.         Sincere

## (undated) NOTE — LETTER
Date: 9/4/2019    Patient Name: Artem Mcpherson          To Whom it may concern: The above patient was seen at the [facility] for treatment of a medical condition.     This patient should be excused from attending work/school from 9/4-9/8/

## (undated) NOTE — ED AVS SNAPSHOT
Michelle Doll   MRN: [id number]    Department:  [facility] Emergency Department in [place]   Date of Visit:  9/2/2019           Disclosure     Insurance plans vary and the physician(s) referred by the ER may not be covered by your plan.  Please contact your tell this physician (or your personal doctor if your instructions are to return to your personal doctor) about any new or lasting problems. The primary care or specialist physician will see patients referred from the [facility] Emergency Department.  Abdon Rivas